# Patient Record
(demographics unavailable — no encounter records)

---

## 2024-12-12 NOTE — ASSESSMENT
[FreeTextEntry1] : It was my impression that she has sensorineural hearing losses which are followed by her audiologist.  I recommended having her hearing aids adjusted as needed.  She had a combination of cerumen impaction and hearing aid dome removed microscopically without trauma  She has some rhinitis but I did not see clinical evidence of sinus disease and recommended fluticasone rather than azelastine  I did not find a cause for her cough and she denies any significant reflux.  She will follow-up with her pulmonologist

## 2024-12-12 NOTE — HISTORY OF PRESENT ILLNESS
[de-identified] : XOCHILT OWENS is a 76 year old female who comes in complaining of Having a cerumen impaction in the right ear or so she was told.  She denies otalgia or otorrhea.  She uses hearing aids binaurally.  Additionally, she had an upper respiratory tract infection about 2 months ago and this has resolved.  She was using some inhalers and has some pulmonary changes and still has a nonproductive cough and is concerned.  She had some nasal congestion and took azelastine which seemed to help this morning.  The patient had no other ear nose or throat complaints at this visit.